# Patient Record
Sex: FEMALE | Race: WHITE
[De-identification: names, ages, dates, MRNs, and addresses within clinical notes are randomized per-mention and may not be internally consistent; named-entity substitution may affect disease eponyms.]

---

## 2017-11-24 ENCOUNTER — HOSPITAL ENCOUNTER (EMERGENCY)
Dept: HOSPITAL 74 - JER | Age: 57
LOS: 1 days | Discharge: HOME | End: 2017-11-25
Payer: COMMERCIAL

## 2017-11-24 VITALS — BODY MASS INDEX: 26.6 KG/M2

## 2017-11-24 VITALS — DIASTOLIC BLOOD PRESSURE: 76 MMHG | TEMPERATURE: 98.1 F | SYSTOLIC BLOOD PRESSURE: 123 MMHG | HEART RATE: 102 BPM

## 2017-11-24 DIAGNOSIS — G89.18: Primary | ICD-10-CM

## 2017-11-24 DIAGNOSIS — R10.30: ICD-10-CM

## 2017-11-24 LAB
ALBUMIN SERPL-MCNC: 3.4 G/DL (ref 3.4–5)
ALP SERPL-CCNC: 120 U/L (ref 45–117)
ALT SERPL-CCNC: 29 U/L (ref 12–78)
ANION GAP SERPL CALC-SCNC: 7 MMOL/L (ref 8–16)
AST SERPL-CCNC: 14 U/L (ref 15–37)
BASOPHILS # BLD: 0.9 % (ref 0–2)
BILIRUB SERPL-MCNC: 0.3 MG/DL (ref 0.2–1)
BUN SERPL-MCNC: 13 MG/DL (ref 7–18)
CALCIUM SERPL-MCNC: 8.6 MG/DL (ref 8.5–10.1)
CHLORIDE SERPL-SCNC: 104 MMOL/L (ref 98–107)
CO2 SERPL-SCNC: 29 MMOL/L (ref 21–32)
CREAT SERPL-MCNC: 0.7 MG/DL (ref 0.55–1.02)
DEPRECATED RDW RBC AUTO: 12.9 % (ref 11.6–15.6)
EOSINOPHIL # BLD: 2.1 % (ref 0–4.5)
GLUCOSE SERPL-MCNC: 95 MG/DL (ref 74–106)
HCT VFR BLD CALC: 36.4 % (ref 32.4–45.2)
HGB BLD-MCNC: 12.7 GM/DL (ref 10.7–15.3)
LYMPHOCYTES # BLD: 36.5 % (ref 8–40)
MCH RBC QN AUTO: 30.7 PG (ref 25.7–33.7)
MCHC RBC AUTO-ENTMCNC: 34.9 G/DL (ref 32–36)
MCV RBC: 88 FL (ref 80–96)
MONOCYTES # BLD AUTO: 7.7 % (ref 3.8–10.2)
NEUTROPHILS # BLD: 52.8 % (ref 42.8–82.8)
PLATELET # BLD AUTO: 400 K/MM3 (ref 134–434)
PMV BLD: 7.9 FL (ref 7.5–11.1)
POTASSIUM SERPLBLD-SCNC: 4 MMOL/L (ref 3.5–5.1)
PROT SERPL-MCNC: 7.4 G/DL (ref 6.4–8.2)
RBC # BLD AUTO: 4.13 M/MM3 (ref 3.6–5.2)
SODIUM SERPL-SCNC: 140 MMOL/L (ref 136–145)
WBC # BLD AUTO: 7.8 K/MM3 (ref 4–10)

## 2017-11-24 PROCEDURE — 3E03329 INTRODUCTION OF OTHER ANTI-INFECTIVE INTO PERIPHERAL VEIN, PERCUTANEOUS APPROACH: ICD-10-PCS | Performed by: STUDENT IN AN ORGANIZED HEALTH CARE EDUCATION/TRAINING PROGRAM

## 2017-11-24 PROCEDURE — 3E0337Z INTRODUCTION OF ELECTROLYTIC AND WATER BALANCE SUBSTANCE INTO PERIPHERAL VEIN, PERCUTANEOUS APPROACH: ICD-10-PCS | Performed by: STUDENT IN AN ORGANIZED HEALTH CARE EDUCATION/TRAINING PROGRAM

## 2017-11-24 NOTE — PDOC
History of Present Illness





- General


History Source: Patient





- History of Present Illness


Initial Comments: 





11/24/17 23:19


"The patient is a 56 year old female with significant medical history of 

hyperlipidemia and prediabetes who presents to the ED 10 days s/p 

abdominoplasty with complaints of increasing redness and pain at her  incision 

site for the past day.  The patient reports the pain to be worst in the right 

lower quadrant.  She reports a subjective fever secondary to her symptoms, but 

denies any chills.  She reports seeing her surgeon 4 days post-op and was 

reported to be well.  She reports her next visit to her surgeon will be in 

December.  She denies nausea and vomiting, diaphoresis, cough, SOB, CP, or 

urinary symptoms. 





PCP: Dr. Wilbur Alejo


"





<Vidya Barrera - Last Filed: 11/24/17 23:19>





<Mumtaz Magallanes - Last Filed: 11/25/17 03:11>





- General


Chief Complaint: Pain, Acute


Stated Complaint: POST OP PAIN


Time Seen by Provider: 11/24/17 19:48





Past History





<Vidya Barrera - Last Filed: 11/24/17 23:19>





- Past Medical History


COPD: No


Diabetes: Yes (PRE-DIABETES)





- Suicide/Smoking/Psychosocial Hx


Smoking History: Never smoked


Hx Alcohol Use: No


Drug/Substance Use Hx: No


Substance Use Type: None


Hx Substance Use Treatment: No





<Mumtaz Magallanes - Last Filed: 11/25/17 03:11>





- Past Medical History


Allergies/Adverse Reactions: 


 Allergies











Allergy/AdvReac Type Severity Reaction Status Date / Time


 


cephalexin Allergy   Verified 11/24/17 19:49











Home Medications: 


Ambulatory Orders





Ibuprofen [Motrin -] 600 mg PO TID PRN 11/24/17 


Amox-Tr/K Cl [Augmentin - 875Mg Tablet] 1 tab PO BID #10 tablet 11/25/17 











**Review of Systems





- Review of Systems


Comments:: 





11/24/17 23:20


"CONSTITUTIONAL: + Subjective Fever


No reported: Chills, Diaphoresis, Generalized Weakness, Malaise, Loss of 

Appetite


HEENT:


No reported: Rhinorrhea, Nasal Congestion, Throat Pain, Throat Swelling, 

Difficulty Swallowing, Mouth Swelling, Ear Pain, Eye Pain, Visual Changes


CARDIOVASCULAR:


No reported: Chest Pain, Syncope, Palpitations, Irregular Heart Rate, 

Lightheadedness, Peripheral Edema


RESPIRATORY:


No reported: Cough, Shortness of Breath, SOB with Exertion, Orthopnea, Wheezing

, Stridor, Hemoptysis


GASTROINTESTINAL: + Abdominal Pain


No reported:  Abdominal Distension, Nausea, Vomiting, Diarrhea, Constipation, 

Melena, Hematochezia


GENITOURINARY:


No reported: Dysuria, Frequency, Urgency, Hesitancy, Flank Pain, Genital Pain


MUSCULOSKELETAL: 


No reported: Myalgia, Arthralgia, Joint Swelling, Back pain, Neck Pain


SKIN: +Redness at incision site


No reported: Itching, Pallor


HEMEATOLOGIC/IMMUNOLOGIC:


No reported: Easy Bleeding, Easy Bruising, Lymphadenopathy, Frequent infections


ENDOCRINE:


No reported: Unexplained Weight Gain, Unexplained Weight Loss, Heat Intolerance

, Cold Intolerance


NEUROLOGIC:


No reported: Headache, Focal Weakness, Paresthesias, Vertigo, Lightheadedness, 

Unsteady Gait, Seizure, Mental Status Changes, Incontinence


PSYCHIATRIC:


No reported: Anxiety, Depression 


"


All Other Systems: Reviewed and Negative





<Vidya Barrera - Last Filed: 11/24/17 23:19>





*Physical Exam





- Vital Signs


 Last Vital Signs











Temp Pulse Resp BP Pulse Ox


 


 98.1 F   102 H  16   123/76   99 


 


 11/24/17 19:51  11/24/17 19:51  11/24/17 19:51  11/24/17 19:51  11/24/17 19:51














- Physical Exam


Comments: 





11/24/17 23:20


"GENERAL: The patient is awake, alert, and fully oriented, Nontoxic - in no 

acute distress.


HEAD: Normocephalic, atraumatic.


EYES: extraocular movements intact, sclera anicteric, conjunctiva clear.


ENT: Normal voice,  Moist mucous membranes.


NECK: Normal range of motion, supple


LUNGS: Breath sounds equal, clear to auscultation bilaterally.  No wheezes, no 

rhonchi, no rales.


HEART: Regular rate and rhythm, normal S1 and S2 without murmur, rub or gallop.


ABDOMEN: Soft, nontender,large horizontal incision on lower abdomen, on R side 

of abdomen +induration with some yellowish translucent discharge, mild erythema

, +ttp EXTREMITIES: Normal range of motion, no edema.  No clubbing or cyanosis. 

No cords, erythema, or tenderness.


NEUROLOGICAL: No facial assymetry, Normal speech, 


PSYCH: Normal mood, normal affect.


SKIN: Warm, Dry, normal turgor,"





<Vidya Barrera - Last Filed: 11/24/17 23:19>





- Vital Signs


 Last Vital Signs











Temp Pulse Resp BP Pulse Ox


 


 98.1 F   102 H  16   123/76   99 


 


 11/24/17 19:51  11/24/17 19:51  11/24/17 19:51  11/24/17 19:51  11/24/17 19:51














<Mumtaz Magallanes - Last Filed: 11/25/17 03:11>





ED Treatment Course





- LABORATORY


CBC & Chemistry Diagram: 


 11/24/17 21:25





 11/24/17 21:25





- ADDITIONAL ORDERS


Additional order review: 


 Laboratory  Results











  11/24/17





  21:25


 


Sodium  140


 


Potassium  4.0


 


Chloride  104


 


Carbon Dioxide  29


 


Anion Gap  7 L


 


BUN  13


 


Creatinine  0.7


 


Creat Clearance w eGFR  > 60


 


Random Glucose  95


 


Calcium  8.6


 


Total Bilirubin  0.3


 


AST  14 L


 


ALT  29


 


Alkaline Phosphatase  120 H


 


Total Protein  7.4


 


Albumin  3.4








 











  11/24/17





  21:25


 


RBC  4.13


 


MCV  88.0


 


MCHC  34.9


 


RDW  12.9


 


MPV  7.9


 


Neutrophils %  52.8


 


Lymphocytes %  36.5


 


Monocytes %  7.7


 


Eosinophils %  2.1


 


Basophils %  0.9














- Medications


Given in the ED: 


ED Medications














Discontinued Medications














Generic Name Dose Route Start Last Admin





  Trade Name Antonioq  PRN Reason Stop Dose Admin


 


Sodium Chloride  1,000 mls @ 1,000 mls/hr  11/24/17 21:14  11/24/17 21:33





  Normal Saline -  IV  11/24/17 22:13  1,000 mls/hr





  .Q1H ONE   Administration














<Vidya Barrera - Last Filed: 11/24/17 23:19>





- LABORATORY


CBC & Chemistry Diagram: 


 11/24/17 21:25





 11/24/17 21:25





- RADIOLOGY


Radiology Studies Ordered: 














 Category Date Time Status


 


 ABDOMEN & PELVIS CT WITH CONTR [CT] Stat CT Scan  11/24/17 21:13 Ordered














<Mumtaz Magallanes - Last Filed: 11/25/17 03:11>





Medical Decision Making





- Medical Decision Making





11/24/17 21:14


56y F s/p abdominoplasty 10 days ago presenting with worsening pain/redness of 

her wound


+mild dc, mild tenderness/induration on the R side of her wound, minimal 

erythema


concern for infctio nvs abscess/collection





will ck labs, ct abd


will erasess





A portion of this note was documented by scribe services under my direction. I 

have reviewed the details of the note, within reason, and agree with the 

documentation with the following case summary and management plan written by me


11/25/17 02:38


pts labs reviewed


no signs of leukocytosis





ct abdomen shows some small collections approx 3.6 x 1.2 x 2.9 n ruq abd wall 

and and right anterior pelvic wall measuring 2.9 x 1.2 x 1.4. 


11/25/17 02:41


will give pt some ceftriaxone


will discuss with dr. Gold regarding CT results and plan


([phone])


11/25/17 03:02





case dw dr. Bell


states that it is frequent to have these collections


would recommend pt fu with him as outpatient tomorrow


would recommend abx, pt did not tolerate prophylactic keflex, is ok with 

augmentin


copy of ct results given to pt to review with dr. brady





retunr precautions were discussed








I discussed the physical exam findings, ancillary test results and final 

diagnoses with the patient. I answered all of the patient's questions. The 

patient was satisfied with the care received and felt comfortable with the 

discharge plan and treatment plan. The patient will call their primary care 

physician within 24 hours to arrange follow-up and will return to the Emergency 

Department with any new, persistent or worsening symptoms. 





11/25/17 03:06








<Mumtaz Magallanes - Last Filed: 11/25/17 03:11>





*DC/Admit/Observation/Transfer





- Attestations


Scribe Attestion: 





11/24/17 23:21





Documentation prepared by Vidya Barrera, acting as medical scribe for 

Mumtaz Magallanes MD.





<Vidya Barrera - Last Filed: 11/24/17 23:19>





- Discharge Dispostion


Admit: No





<Mumtaz Magallanes - Last Filed: 11/25/17 03:11>


Diagnosis at time of Disposition: 


 Post-operative pain








- Discharge Dispostion


Disposition: HOME


Condition at time of disposition: Stable





- Prescriptions


Prescriptions: 


Amox-Tr/K Cl [Augmentin - 875Mg Tablet] 1 tab PO BID #10 tablet





- Referrals


Referrals: 


Wilbur Alejo [Primary Care Provider] - 


Frankie Leavitt MD [Non Staff, Medical] - 





- Patient Instructions


Printed Discharge Instructions:  DI for Wound Infection


Additional Instructions: 


Regrese al servicio de urgencias de inmediato con CUALQUIER sntoma nuevo, 

persistente o que empeora, incluidas las fiebres, escalofros, aumento del dolor

, enrojecimiento u otras inquietudes





DEBE llamar y hacer un seguimiento con el Dr. Bell maana para va mayor 

evaluacin de romeo sntomas. Los resultados fueron discutidos con usted. Aseg

rese de que fagan mdico revise los resultados de fagan evaluacin de emergencia.


=========





Return to the emergency department immediately with ANY new, persistent or 

worsening symptoms including any fevers, chills, increased pain, redness or 

other concerns





You MUST call and follow up with Dr. Bell tomorrow for further evaluation 

of your symptoms. Results were discussed with you. Please make sure your doctor 

reviews the results of your emergency evaluation.





Print Language: Kiswahili





- Post Discharge Activity

## 2017-11-24 NOTE — PDOC
Rapid Medical Evaluation


Time Seen by Provider: 11/24/17 19:48


Medical Evaluation: 


 Allergies











Allergy/AdvReac Type Severity Reaction Status Date / Time


 


No Known Allergies Allergy   Verified 12/22/16 15:17











11/24/17 19:48


I have performed a brief in-person evaluation of this patient.





The patient presents with a chief complaint of: Abd incisional redness/pain  


abdominoplasty x10d ago in [place]





Pertinent physical exam findings: lower abd incision/ +erythematous/tenderon 

palp





I have ordered the following: wound culture to incision


                 


The patient will proceed to the ED for further evaluation:

## 2017-11-28 NOTE — PDOC
Patient Follow-up (Call Back)





- Post ED Follow - Up


Condition at time of discharge: Stable


Disposition at time of original discharge: HOME


Reason for Call Back: Abnwl. Microbiology (Preliminary shows non-lactose 

fermenting GNB and Staphylococcus coagulase-negative. Patient currently placed 

on Augmentin which is adequate coverage. Will await final report.)

## 2019-01-23 ENCOUNTER — HOSPITAL ENCOUNTER (EMERGENCY)
Dept: HOSPITAL 74 - JER | Age: 59
LOS: 1 days | Discharge: HOME | End: 2019-01-24
Payer: COMMERCIAL

## 2019-01-23 VITALS — BODY MASS INDEX: 29.2 KG/M2

## 2019-01-23 VITALS — SYSTOLIC BLOOD PRESSURE: 147 MMHG | HEART RATE: 109 BPM | TEMPERATURE: 97.9 F | DIASTOLIC BLOOD PRESSURE: 77 MMHG

## 2019-01-23 DIAGNOSIS — R73.03: ICD-10-CM

## 2019-01-23 DIAGNOSIS — R51: Primary | ICD-10-CM

## 2019-01-23 DIAGNOSIS — E03.9: ICD-10-CM

## 2019-01-23 DIAGNOSIS — R42: ICD-10-CM

## 2019-01-23 LAB
ALBUMIN SERPL-MCNC: 4.2 G/DL (ref 3.4–5)
ALP SERPL-CCNC: 104 U/L (ref 45–117)
ALT SERPL-CCNC: 38 U/L (ref 13–61)
ANION GAP SERPL CALC-SCNC: 8 MMOL/L (ref 8–16)
APPEARANCE UR: CLEAR
AST SERPL-CCNC: 35 U/L (ref 15–37)
BASOPHILS # BLD: 0.5 % (ref 0–2)
BILIRUB SERPL-MCNC: 0.5 MG/DL (ref 0.2–1)
BILIRUB UR STRIP.AUTO-MCNC: NEGATIVE MG/DL
BUN SERPL-MCNC: 10 MG/DL (ref 7–18)
CALCIUM SERPL-MCNC: 9 MG/DL (ref 8.5–10.1)
CHLORIDE SERPL-SCNC: 102 MMOL/L (ref 98–107)
CO2 SERPL-SCNC: 29 MMOL/L (ref 21–32)
COLOR UR: YELLOW
CREAT SERPL-MCNC: 0.8 MG/DL (ref 0.55–1.3)
DEPRECATED RDW RBC AUTO: 12.6 % (ref 11.6–15.6)
EOSINOPHIL # BLD: 0.5 % (ref 0–4.5)
EPITH CASTS URNS QL MICRO: (no result) /HPF
GLUCOSE SERPL-MCNC: 96 MG/DL (ref 74–106)
HCT VFR BLD CALC: 42.2 % (ref 32.4–45.2)
HGB BLD-MCNC: 14.6 GM/DL (ref 10.7–15.3)
KETONES UR QL STRIP: NEGATIVE
LEUKOCYTE ESTERASE UR QL STRIP.AUTO: (no result)
LYMPHOCYTES # BLD: 42.5 % (ref 8–40)
MCH RBC QN AUTO: 30.6 PG (ref 25.7–33.7)
MCHC RBC AUTO-ENTMCNC: 34.6 G/DL (ref 32–36)
MCV RBC: 88.5 FL (ref 80–96)
MONOCYTES # BLD AUTO: 4.7 % (ref 3.8–10.2)
MUCOUS THREADS URNS QL MICRO: (no result)
NEUTROPHILS # BLD: 51.8 % (ref 42.8–82.8)
NITRITE UR QL STRIP: NEGATIVE
PH UR: 6 [PH] (ref 5–8)
PLATELET # BLD AUTO: 287 K/MM3 (ref 134–434)
PMV BLD: 9.1 FL (ref 7.5–11.1)
POTASSIUM SERPLBLD-SCNC: 4.2 MMOL/L (ref 3.5–5.1)
PROT SERPL-MCNC: 8.1 G/DL (ref 6.4–8.2)
PROT UR QL STRIP: NEGATIVE
PROT UR QL STRIP: NEGATIVE
RBC # BLD AUTO: 4.77 M/MM3 (ref 3.6–5.2)
SODIUM SERPL-SCNC: 138 MMOL/L (ref 136–145)
SP GR UR: 1.02 (ref 1.01–1.03)
UROBILINOGEN UR STRIP-MCNC: NEGATIVE MG/DL (ref 0.2–1)
WBC # BLD AUTO: 8.5 K/MM3 (ref 4–10)

## 2019-01-23 PROCEDURE — 3E0337Z INTRODUCTION OF ELECTROLYTIC AND WATER BALANCE SUBSTANCE INTO PERIPHERAL VEIN, PERCUTANEOUS APPROACH: ICD-10-PCS

## 2019-01-23 PROCEDURE — 3E033NZ INTRODUCTION OF ANALGESICS, HYPNOTICS, SEDATIVES INTO PERIPHERAL VEIN, PERCUTANEOUS APPROACH: ICD-10-PCS

## 2019-01-23 PROCEDURE — 3E033GC INTRODUCTION OF OTHER THERAPEUTIC SUBSTANCE INTO PERIPHERAL VEIN, PERCUTANEOUS APPROACH: ICD-10-PCS

## 2019-01-23 NOTE — PDOC
Attending Attestation





- Resident


Resident Name: Lydia Cevallos





- ED Attending Attestation


I have performed the following: I have examined & evaluated the patient, The 

case was reviewed & discussed with the resident, I agree w/resident's findings 

& plan, Exceptions are as noted





- HPI


HPI: 





01/23/19 18:34


The patient is a 51 year old female, with a significant past medical history of 

HLD, prediabetes, hypothyroidism, and migraines who presents to the emergency 

department complaining of headaches, dizziness and generalized weakness for one 

week. Patient states that she has been having these headaches for a couple of 

months now but have recently gotten worse and her PMD recommended a neurologist 

but was told to come to the ED if the pain does not subside. Patient reports 

localized gradual onset posterior headaches and endorses dizziness described as 

room-spinning, sinus fullness and nausea. Patient also reports light 

exacerbates her headache. The patient has been taking Tylenol and ibuprofen 800 

for the pain with good relief. Denies stiff neck, focal weakness/numbness. 

REports the room spinning comes on with the headache and both improve with 

NSAIDS/tylenol. 





The patient denies chest pain, shortness of breath, tinnitus, phonophobia, and 

back pain. The patient denies fever, chills, vomit, diarrhea and constipation. 

The patient denies dysuria, frequency, urgency and hematuria.





Allergies: cephalexin, Keflex


Past surgical history:


Social history: No reported


PCP: Dr. Youngblood











- Physicial Exam


PE: 





01/23/19 18:36


GENERAL: Awake, alert, and fully oriented, in no acute distress. Non toxic.


HEAD: No signs of trauma


EYES: PERRLA, EOMI, sclera anicteric, conjunctiva clear


ENT: Auricles normal inspection, hearing grossly normal, nares patent, 

oropharynx clear without exudates. Moist mucosa


NECK: Normal ROM, supple, no lymphadenopathy, JVD, or masses


LUNGS: Breath sounds equal, clear to auscultation bilaterally. No wheezes, and 

no crackles


HEART: Regular rate and rhythm, normal S1 and S2, no murmurs, rubs or gallops


ABDOMEN: Soft, nontender, normoactive bowel sounds. No guarding, no rebound. No 

masses


EXTREMITIES: Normal range of motion, no edema. No clubbing or cyanosis. No cords

, erythema, or tenderness


BACK: No midline spinal tenderness in cervical/thoracic/lumbar region


NEUROLOGICAL: Normal speech, cranial nerves intact, negative pronator drift, 5/

5 strength in all 4 extremities, normal sensation to light touch in all 4 

extremities, normal cerebellar exam, normal gait, normal tone


SKIN: Warm, Dry, normal turgor, no rashes or lesions noted.








- Medical Decision Making





01/23/19 18:38


57yo F hx HL, migraines presents to the ED with 1 week of intermittent headache

, weakness, room spinning dizziness. Vitals with tachycardia on arrival, on my 

exam, HR 88. REmaining vitals wnl. Exam wnl, normal neurologic exam. No red 

flag symptoms of sudden onset headache, or peak within 30 mins. Pt is also 

neuro intact. Dizziness is positional, improves with not moving her head. Plan 

to treat with meclizine, IV tylenol, fluids, reglan and reassess. 





**Heart Score/ECG Review


  ** #1





01/23/19 18:50


Twelve-lead EKG was performed and reviewed by me. Normal sinus rhythm, rate 91. 

Normal axis and intervals. No ST elevations or T-wave inversions.

## 2019-01-23 NOTE — PDOC
History of Present Illness





- General


Chief Complaint: Lightheaded


Stated Complaint: PAIN


Time Seen by Provider: 01/23/19 16:58


History Source: Patient


Exam Limitations: Language Barrier





- History of Present Illness


Initial Comments: 





01/23/19 17:49


Pt is a 59yo F with PMH of prediabetes, hypothyroidism, migraines presenting to 

ED with complaints of 1 week of headaches, dizziness and generalized weakness 

x1 week. Pt states that the headaches are located in the back of her head, does 

not radiate. She has been having these headaches for a couple months now. She 

went to her PMD who recommended a neurologist but was told to come to the ED if 

she did not feel right. She endorses vertiginous symptoms, sinus fullness and 

nausea. She also states that she has some photophobia.  She denies fevers, 

chills, neck stiffness, numbness/tingling, vomiting, tinnitus, ear fullness, 

phonophobia, focal neurological deficits, back pain, chest pain, palpitations, 

SOB. She has been taking Tylenol and ibuprofen 800 for the pain which "helps a 

little bit". 





PMD: Rylie


PMH: see hpi


Meds: see med rec


Allergies: Keflex


Social: denies








Past History





- Past Medical History


Allergies/Adverse Reactions: 


 Allergies











Allergy/AdvReac Type Severity Reaction Status Date / Time


 


cephalexin Allergy   Verified 11/24/17 19:49











Home Medications: 


Ambulatory Orders





NK [No Known Home Medication]  01/23/19 








COPD: No


Diabetes: Yes (PRE-DIABETES)





- Surgical History


Cardiac Surgery: No


Lung Surgery: No





- Immunization History


Immunization Up to Date: No





- Suicide/Smoking/Psychosocial Hx


Smoking History: Never smoked


Have you smoked in the past 12 months: No


Information on smoking cessation initiated: No


Hx Alcohol Use: No


Drug/Substance Use Hx: No


Substance Use Type: None


Hx Substance Use Treatment: No





**Review of Systems





- Review of Systems


Constitutional: Yes: Malaise.  No: Chills, Fever


HEENTM: Yes: Other (sinus pressure).  No: Eye Pain, Blurred Vision, Double 

Vision, Ear Pain, Nose Pain, Tinnitus, Nose Bleeding, Throat Pain


Respiratory: No: Cough, Shortness of Breath


Cardiac (ROS): No: Chest Pain, Lightheadedness, Palpitations, Syncope


ABD/GI: Yes: Nausea.  No: Constipated, Diarrhea, Vomiting, Abdominal cramping


: No: Symptoms Reported


Musculoskeletal: No: Back Pain, Joint Pain


Integumentary: No: Symptoms Reported


Neurological: Yes: See HPI, Headache, Dizziness.  No: Numbness, Paresthesia, 

Tingling, Weakness, Ataxia





*Physical Exam





- Vital Signs


 Last Vital Signs











Temp Pulse Resp BP Pulse Ox


 


 97.9 F   109 H  18   147/77   100 


 


 01/23/19 16:59  01/23/19 16:59  01/23/19 16:59  01/23/19 16:59  01/23/19 16:59














- Physical Exam


General Appearance: Yes: Nourished, Appropriately Dressed.  No: Apparent 

Distress


HEENT: positive: EOMI, BILL, Pharynx Normal.  negative: Photophobia, Nasal 

Congestion


Neck: positive: Trachea midline, Supple.  negative: Lymphadenopathy (R), 

Lymphadenopathy (L)


Respiratory/Chest: positive: Lungs Clear, Normal Breath Sounds.  negative: 

Crackles, Rales, Rhonchi, Stridor, Wheezing


Cardiovascular: positive: Regular Rhythm, S1, S2, Tachycardia.  negative: Edema

, JVD, Murmur


Vascular Pulses: Carotid (R): 2+, Carotid (L): 2+, Dorsalis-Pedis (R): 2+, 

Doralis-Pedis (L): 2+


Gastrointestinal/Abdominal: positive: Normal Bowel Sounds, Soft.  negative: 

Distended, Guarding, Rebound, Tenderness


Musculoskeletal: negative: CVA Tenderness


Extremity: positive: Normal Capillary Refill.  negative: Pedal Edema, Swelling, 

Calf Tenderness


Integumentary: positive: Normal Color, Dry, Warm


Neurologic: positive: CNs II-XII NML intact, Fully Oriented, Alert, Normal Mood/

Affect, Normal Response, Motor Strength 5/5, Finger to Nose.  negative: Facial 

Droop, Numbness, Sensory Deficit, Confused, Disoriented





Moderate Sedation





- Procedure Monitoring


Vital Signs: 


Procedure Monitoring Vital Signs











Temperature  97.9 F   01/23/19 16:59


 


Pulse Rate  109 H  01/23/19 16:59


 


Respiratory Rate  18   01/23/19 16:59


 


Blood Pressure  147/77   01/23/19 16:59


 


O2 Sat by Pulse Oximetry (%)  100   01/23/19 16:59











ED Treatment Course





- LABORATORY


CBC & Chemistry Diagram: 


 01/23/19 17:15





 01/23/19 17:07





- ADDITIONAL ORDERS


Additional order review: 


 Laboratory  Results











  01/23/19





  17:15


 


Urine Color  Yellow


 


Urine Appearance  Clear


 


Urine pH  6.0


 


Ur Specific Gravity  1.016


 


Urine Protein  Negative


 


Urine Glucose (UA)  Negative


 


Urine Ketones  Negative


 


Urine Blood  1+ H


 


Urine Nitrite  Negative


 


Urine Bilirubin  Negative


 


Urine Urobilinogen  Negative


 


Ur Leukocyte Esterase  Trace








 











  01/23/19





  17:15


 


RBC  4.77


 


MCV  88.5


 


MCHC  34.6


 


RDW  12.6


 


MPV  9.1


 


Neutrophils %  51.8


 


Lymphocytes %  42.5 H


 


Monocytes %  4.7


 


Eosinophils %  0.5


 


Basophils %  0.5














Medical Decision Making





- Medical Decision Making





01/23/19 19:05


Pt is a 59yo F with PMH of prediabetes, hypothyroidism, migraines presenting to 

ED with complaints of 1 week of headaches, dizziness and generalized weakness 

x1 week. Has appointment with Dr. Perez tomorrow, referred by PMD.


   Vitals: hr 109


   PE: no neurological deficits, no nystagmus. 





Pt has had migraines in the past but were R sided. Headaches now are posterior 

alleviated with ibuprofen and Tylenol. Seems more likely migraine type of 

headache. Low suspicion for posterior CVA given no neurological deficits, pt 

has been having headaches on and off for a few months, alleviated with Tylenol 

and ibuprofen. Low suspicion for vertebral dissection given no trauma, no 

hypertension. Pt is ambulating around ED, no signs of ataxia. 





Labs ordered by RME. Added EKG. 





Labs wnl. EKG pending. 





Pt given IV fluids, meclizine, zofran, IV tylenol, IV reglan and IV benadryl. 


Will reassess.





Pt signed out to night team.











*DC/Admit/Observation/Transfer


Diagnosis at time of Disposition: 


 Dizziness





Headache


Qualifiers:


 Headache type: unspecified Headache chronicity pattern: acute headache 

Intractability: not intractable Qualified Code(s): R51 - Headache








- Discharge Dispostion


Disposition: HOME


Condition at time of disposition: Improved





- Referrals


Referrals: 


Samm Youngblood MD [Primary Care Provider] - 


Walter Perez MD [Staff Physician] - 





- Patient Instructions


Printed Discharge Instructions:  DI for Headache


Additional Instructions: 


Hoy te vieron aqu por shamir de nishant y mareos. [place] pruebas de laboratorio 

fueron normales. [place] es muy probablemente un tipo de dolor de nishant de 

migraa.


Siga tomando Tylenol e ibuprofeno segn sea necesario para el dolor de nishant. 

Por favor, asegrese de mantener fagan clemente con el neurlogo maana.





Regrese a la brianna de emergencias si los shamir de nishant empeoran, tiene 

cambios en la visin, comienza a vomitar, tiene adormecimiento / hormigueo, 

tiene debilidad en los brazos o las piernas, se desmaya o si aparece algn 

sntoma nuevo.





Thao











You were seen here today for headaches and dizziness. Your lab tests were 

normal. This is most likely a migraine type of headache. 


Keep taking Tylenol and ibuprofen as needed for your headache. Please make sure 

you keep your appointment with the neurologist tomorrow. 





Come back to the emergency room if headaches get worse, you have changes in 

vision, you start vomiting, you have numbness/tingling, you have weakness in 

your arms or legs, you pass out or if any new concerning symptom develops.





Thank you


Print Language: SPA





- Post Discharge Activity

## 2019-01-23 NOTE — PDOC
Rapid Medical Evaluation


Time Seen by Provider: 01/23/19 16:58


Medical Evaluation: 


 Allergies











Allergy/AdvReac Type Severity Reaction Status Date / Time


 


cephalexin Allergy   Verified 11/24/17 19:49











01/23/19 17:00


I performed a brief in-person evaluation of this patient.


Chief complaint is: Headache, dizziness, generalized weakness x 1 week.


Pertinent physical exam findings include: Mild tachycardia. No focal neurologic 

deficits.


I have ordered the following: Labs, urine.





Patient will proceed to the ED for further evaluation.








**Discharge Disposition





- Diagnosis


Headache


Qualifiers:


 Headache type: unspecified Headache chronicity pattern: acute headache 

Intractability: not intractable Qualified Code(s): R51 - Headache








- Referrals





- Patient Instructions





- Post Discharge Activity

## 2019-01-24 NOTE — EKG
Test Reason : 

Blood Pressure : ***/*** mmHG

Vent. Rate : 091 BPM     Atrial Rate : 091 BPM

   P-R Int : 142 ms          QRS Dur : 066 ms

    QT Int : 362 ms       P-R-T Axes : 049 060 043 degrees

   QTc Int : 445 ms

 

NORMAL SINUS RHYTHM

NORMAL ECG

NO PREVIOUS ECGS AVAILABLE

Confirmed by MALATHI DUBON MD ([id number]) on 1/24/2019 12:22:44 PM

 

Referred By:             Confirmed By:MALATHI DUBON MD

## 2019-01-24 NOTE — PDOC
*Physical Exam





- Vital Signs


 Last Vital Signs











Temp Pulse Resp BP Pulse Ox


 


 97.9 F   109 H  18   147/77   100 


 


 01/23/19 16:59  01/23/19 16:59  01/23/19 16:59  01/23/19 16:59  01/23/19 16:59














ED Treatment Course





- LABORATORY


CBC & Chemistry Diagram: 


 01/23/19 17:15





 01/23/19 17:07





- ADDITIONAL ORDERS


Additional order review: 


 Laboratory  Results











  01/23/19 01/23/19





  17:15 17:07


 


Sodium   138


 


Potassium   4.2


 


Chloride   102


 


Carbon Dioxide   29


 


Anion Gap   8


 


BUN   10


 


Creatinine   0.8


 


Creat Clearance w eGFR   > 60


 


Random Glucose   96


 


Calcium   9.0


 


Total Bilirubin   0.5


 


AST   35


 


ALT   38


 


Alkaline Phosphatase   104


 


Total Protein   8.1


 


Albumin   4.2


 


Urine Color  Yellow 


 


Urine Appearance  Clear 


 


Urine pH  6.0 


 


Ur Specific Gravity  1.016 


 


Urine Protein  Negative 


 


Urine Glucose (UA)  Negative 


 


Urine Ketones  Negative 


 


Urine Blood  1+ H 


 


Urine Nitrite  Negative 


 


Urine Bilirubin  Negative 


 


Urine Urobilinogen  Negative 


 


Ur Leukocyte Esterase  Trace 


 


Urine WBC (Auto)  1 


 


Urine RBC (Auto)  3 


 


Ur Epithelial Cells  Rare 


 


Urine Mucus  Rare 








 











  01/23/19





  17:15


 


RBC  4.77


 


MCV  88.5


 


MCHC  34.6


 


RDW  12.6


 


MPV  9.1


 


Neutrophils %  51.8


 


Lymphocytes %  42.5 H


 


Monocytes %  4.7


 


Eosinophils %  0.5


 


Basophils %  0.5














- Medications


Given in the ED: 


ED Medications














Discontinued Medications














Generic Name Dose Route Start Last Admin





  Trade Name Freq  PRN Reason Stop Dose Admin


 


Acetaminophen  1,000 mg  01/23/19 18:15  01/23/19 18:47





  Ofirmev Injection -  IVPB  01/23/19 18:16  1,000 mg





  ONCE ONE   Administration





     





     





     





     


 


Diphenhydramine HCl  25 mg  01/23/19 18:15  01/23/19 18:47





  Benadryl Injection -  IVPUSH  01/23/19 18:16  25 mg





  ONCE ONE   Administration





     





     





     





     


 


Sodium Chloride  1,000 mls @ 1,000 mls/hr  01/23/19 18:15  01/23/19 18:41





  Normal Saline -  IV  01/23/19 19:14  1,000 mls/hr





  ASDIR STA   Administration





     





     





     





     


 


Meclizine HCl  25 mg  01/23/19 18:04  01/23/19 18:23





  Antivert -  PO  01/23/19 18:05  25 mg





  ONCE ONE   Administration





     





     





     





     


 


Metoclopramide HCl  10 mg  01/23/19 18:15  01/23/19 18:23





  Reglan -  PO  01/23/19 18:16  10 mg





  ONCE ONE   Administration





     





     





     





     


 


Ondansetron HCl  4 mg  01/23/19 18:04  01/23/19 18:21





  Zofran Odt -  SL  01/23/19 18:05  4 mg





  ONCE ONE   Administration





     





     





     





     














Medical Decision Making





- Medical Decision Making





01/24/19 01:11


Headahe better. Will DC with neuro follow up.





*DC/Admit/Observation/Transfer


Diagnosis at time of Disposition: 


 Dizziness





Headache


Qualifiers:


 Headache type: unspecified Headache chronicity pattern: acute headache 

Intractability: not intractable Qualified Code(s): R51 - Headache








- Discharge Dispostion


Disposition: HOME


Condition at time of disposition: Improved





- Referrals


Referrals: 


Samm Youngblood MD [Primary Care Provider] - 


Walter Perez MD [Staff Physician] - 





- Patient Instructions


Printed Discharge Instructions:  DI for Headache


Additional Instructions: 


Hoy te vieron aqu por shamir de nishant y mareos. [place] pruebas de laboratorio 

fueron normales. [place] es muy probablemente un tipo de dolor de nishant de 

migraa.


Siga tomando Tylenol e ibuprofeno segn sea necesario para el dolor de nishant. 

Por favor, asegrese de mantener fagan clemente con el neurlogo maana.





Regrese a la brianna de emergencias si los shamir de nishant empeoran, tiene 

cambios en la visin, comienza a vomitar, tiene adormecimiento / hormigueo, 

tiene debilidad en los brazos o las piernas, se desmaya o si aparece algn 

sntoma nuevo.





[place]ao











You were seen here today for headaches and dizziness. Your lab tests were 

normal. This is most likely a migraine type of headache. 


Keep taking Tylenol and ibuprofen as needed for your headache. Please make sure 

you keep your appointment with the neurologist tomorrow. 





Come back to the emergency room if headaches get worse, you have changes in 

vision, you start vomiting, you have numbness/tingling, you have weakness in 

your arms or legs, you pass out or if any new concerning symptom develops.





Thank you


Print Language: SPA





- Post Discharge Activity

## 2021-08-18 ENCOUNTER — HOSPITAL ENCOUNTER (OUTPATIENT)
Dept: HOSPITAL 74 - FASU-ENDO | Age: 61
Discharge: HOME | End: 2021-08-18
Attending: INTERNAL MEDICINE
Payer: COMMERCIAL

## 2021-08-18 VITALS — BODY MASS INDEX: 28.8 KG/M2

## 2021-08-18 VITALS — TEMPERATURE: 97.8 F

## 2021-08-18 VITALS — HEART RATE: 74 BPM | SYSTOLIC BLOOD PRESSURE: 116 MMHG | DIASTOLIC BLOOD PRESSURE: 49 MMHG

## 2021-08-18 DIAGNOSIS — R73.03: ICD-10-CM

## 2021-08-18 DIAGNOSIS — B96.81: ICD-10-CM

## 2021-08-18 DIAGNOSIS — K29.50: Primary | ICD-10-CM

## 2021-08-18 DIAGNOSIS — E78.5: ICD-10-CM

## 2021-08-18 PROCEDURE — 0DB48ZX EXCISION OF ESOPHAGOGASTRIC JUNCTION, VIA NATURAL OR ARTIFICIAL OPENING ENDOSCOPIC, DIAGNOSTIC: ICD-10-PCS | Performed by: INTERNAL MEDICINE

## 2021-08-18 PROCEDURE — 0DB98ZX EXCISION OF DUODENUM, VIA NATURAL OR ARTIFICIAL OPENING ENDOSCOPIC, DIAGNOSTIC: ICD-10-PCS | Performed by: INTERNAL MEDICINE

## 2021-08-18 PROCEDURE — 0DB68ZX EXCISION OF STOMACH, VIA NATURAL OR ARTIFICIAL OPENING ENDOSCOPIC, DIAGNOSTIC: ICD-10-PCS | Performed by: INTERNAL MEDICINE

## 2023-05-09 ENCOUNTER — HOSPITAL ENCOUNTER (EMERGENCY)
Dept: HOSPITAL 74 - JER | Age: 63
LOS: 1 days | Discharge: HOME | End: 2023-05-10
Payer: COMMERCIAL

## 2023-05-09 VITALS — BODY MASS INDEX: 27.4 KG/M2

## 2023-05-09 VITALS — RESPIRATION RATE: 16 BRPM

## 2023-05-09 DIAGNOSIS — Z20.822: ICD-10-CM

## 2023-05-09 DIAGNOSIS — R42: ICD-10-CM

## 2023-05-09 DIAGNOSIS — R07.89: Primary | ICD-10-CM

## 2023-05-09 DIAGNOSIS — R11.0: ICD-10-CM

## 2023-05-09 LAB
ALBUMIN SERPL-MCNC: 4.2 G/DL (ref 3.4–5)
ALP SERPL-CCNC: 89 U/L (ref 45–117)
ALT SERPL-CCNC: 27 U/L (ref 13–61)
ANION GAP SERPL CALC-SCNC: 7 MMOL/L (ref 8–16)
AST SERPL-CCNC: 19 U/L (ref 15–37)
BASOPHILS # BLD: 0.3 % (ref 0–2)
BILIRUB SERPL-MCNC: 0.4 MG/DL (ref 0.2–1)
BUN SERPL-MCNC: 14.7 MG/DL (ref 7–18)
CALCIUM SERPL-MCNC: 9.3 MG/DL (ref 8.5–10.1)
CHLORIDE SERPL-SCNC: 104 MMOL/L (ref 98–107)
CO2 SERPL-SCNC: 29 MMOL/L (ref 21–32)
CREAT SERPL-MCNC: 0.9 MG/DL (ref 0.55–1.3)
DEPRECATED RDW RBC AUTO: 13.5 % (ref 11.6–15.6)
EOSINOPHIL # BLD: 0.5 % (ref 0–4.5)
GLUCOSE SERPL-MCNC: 106 MG/DL (ref 74–106)
HCT VFR BLD CALC: 42 % (ref 32.4–45.2)
HGB BLD-MCNC: 14.5 GM/DL (ref 10.7–15.3)
LYMPHOCYTES # BLD: 34.5 % (ref 8–40)
MAGNESIUM SERPL-MCNC: 2.4 MG/DL (ref 1.8–2.4)
MCH RBC QN AUTO: 29.9 PG (ref 25.7–33.7)
MCHC RBC AUTO-ENTMCNC: 34.5 G/DL (ref 32–36)
MCV RBC: 86.8 FL (ref 80–96)
MONOCYTES # BLD AUTO: 7.3 % (ref 3.8–10.2)
NEUTROPHILS # BLD: 57.4 % (ref 42.8–82.8)
PLATELET # BLD AUTO: 287 10^3/UL (ref 134–434)
PMV BLD: 9.5 FL (ref 7.5–11.1)
POTASSIUM SERPLBLD-SCNC: 3.8 MMOL/L (ref 3.5–5.1)
PROT SERPL-MCNC: 8.3 G/DL (ref 6.4–8.2)
RBC # BLD AUTO: 4.83 M/MM3 (ref 3.6–5.2)
SODIUM SERPL-SCNC: 139 MMOL/L (ref 136–145)
WBC # BLD AUTO: 7.6 K/MM3 (ref 4–10)

## 2023-05-09 PROCEDURE — 3E033NZ INTRODUCTION OF ANALGESICS, HYPNOTICS, SEDATIVES INTO PERIPHERAL VEIN, PERCUTANEOUS APPROACH: ICD-10-PCS

## 2023-05-10 VITALS — SYSTOLIC BLOOD PRESSURE: 128 MMHG | TEMPERATURE: 98.7 F | HEART RATE: 63 BPM | DIASTOLIC BLOOD PRESSURE: 65 MMHG
